# Patient Record
Sex: MALE | Race: WHITE | NOT HISPANIC OR LATINO | Employment: OTHER | URBAN - METROPOLITAN AREA
[De-identification: names, ages, dates, MRNs, and addresses within clinical notes are randomized per-mention and may not be internally consistent; named-entity substitution may affect disease eponyms.]

---

## 2022-03-30 ENCOUNTER — TELEPHONE (OUTPATIENT)
Dept: FAMILY MEDICINE CLINIC | Facility: CLINIC | Age: 67
End: 2022-03-30

## 2022-03-30 PROBLEM — I72.5 BASILAR ARTERY ANEURYSM: Status: ACTIVE | Noted: 2020-01-13

## 2022-03-30 PROBLEM — I10 HTN, GOAL BELOW 130/80: Status: ACTIVE | Noted: 2018-06-29

## 2022-03-30 PROBLEM — N52.8 OTHER MALE ERECTILE DYSFUNCTION: Status: ACTIVE | Noted: 2017-10-19

## 2022-03-30 RX ORDER — PAROXETINE 30 MG/1
30 TABLET, FILM COATED ORAL DAILY
COMMUNITY
Start: 2021-07-21 | End: 2022-03-31 | Stop reason: SDUPTHER

## 2022-03-30 RX ORDER — AMLODIPINE BESYLATE 2.5 MG/1
2.5 TABLET ORAL DAILY
COMMUNITY
Start: 2021-07-21 | End: 2022-03-31 | Stop reason: SDUPTHER

## 2022-03-30 RX ORDER — BISOPROLOL FUMARATE AND HYDROCHLOROTHIAZIDE 10; 6.25 MG/1; MG/1
1 TABLET ORAL DAILY
COMMUNITY
Start: 2021-07-21 | End: 2022-03-31 | Stop reason: SDUPTHER

## 2022-03-30 NOTE — TELEPHONE ENCOUNTER
PATIENT WAS WANTING TO KNOW IF YOU COULD OBTAIN/ REQUEST HIS MED RECORDS FROM [facility]     PLEASE ADVISE   Davi Foster (Self) [phone] (H)

## 2022-03-31 ENCOUNTER — OFFICE VISIT (OUTPATIENT)
Dept: FAMILY MEDICINE CLINIC | Facility: CLINIC | Age: 67
End: 2022-03-31

## 2022-03-31 VITALS
BODY MASS INDEX: 30.45 KG/M2 | SYSTOLIC BLOOD PRESSURE: 135 MMHG | HEIGHT: 67 IN | OXYGEN SATURATION: 93 % | TEMPERATURE: 97.5 F | HEART RATE: 61 BPM | WEIGHT: 194 LBS | DIASTOLIC BLOOD PRESSURE: 86 MMHG

## 2022-03-31 DIAGNOSIS — E66.9 OBESITY, CLASS I, BMI 30-34.9: ICD-10-CM

## 2022-03-31 DIAGNOSIS — N52.8 OTHER MALE ERECTILE DYSFUNCTION: ICD-10-CM

## 2022-03-31 DIAGNOSIS — F41.9 ANXIETY: ICD-10-CM

## 2022-03-31 DIAGNOSIS — I10 ESSENTIAL HYPERTENSION: Primary | ICD-10-CM

## 2022-03-31 PROCEDURE — 99204 OFFICE O/P NEW MOD 45 MIN: CPT | Performed by: NURSE PRACTITIONER

## 2022-03-31 RX ORDER — PAROXETINE 30 MG/1
30 TABLET, FILM COATED ORAL DAILY
Qty: 90 TABLET | Refills: 3 | Status: SHIPPED | OUTPATIENT
Start: 2022-03-31 | End: 2023-03-31

## 2022-03-31 RX ORDER — BISOPROLOL FUMARATE AND HYDROCHLOROTHIAZIDE 10; 6.25 MG/1; MG/1
1 TABLET ORAL DAILY
Qty: 90 TABLET | Refills: 3 | Status: SHIPPED | OUTPATIENT
Start: 2022-03-31 | End: 2023-03-31

## 2022-03-31 RX ORDER — AMLODIPINE BESYLATE 2.5 MG/1
2.5 TABLET ORAL DAILY
Qty: 90 TABLET | Refills: 3 | Status: SHIPPED | OUTPATIENT
Start: 2022-03-31 | End: 2023-03-31

## 2022-03-31 RX ORDER — SILDENAFIL CITRATE 20 MG/1
20 TABLET ORAL 3 TIMES DAILY
COMMUNITY

## 2022-03-31 NOTE — PROGRESS NOTES
Chief Complaint  Anxiety and Hypertension    Subjective          Davi presents to [facility]    67-year-old male presents to the office today for establishment of care and for follow-up on anxiety and hypertension.  Blood pressure is currently controlled on amlodipine 2.5 mg p.o. daily and ZIAC 10-6.25 mg daily.  He has been almost out of his medication and had been taking it every other day for the last couple weeks until he can get into a new PCP.  He has take this medication for a while and has controlled his blood pressure.  His blood pressure runs 120s over 70s at home when he checks it regularly.  Blood pressure in office today was 135/86  .  He denies any headache no blurred vision no dizziness no flushing no chest pain or pressure.    He has been on Paxil 30 mg p.o. tablet daily for his anxiety.  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  He does feel like this controls his anxiety well.  He denies any medication side effects.  He denies any suicidal or homicidal ideations.  He has a support plan in place.  He is not currently seeing anyone for therapy.  He is not ever been on any other medications.  He has increased stress.  He would like at some point to try to wean off this medication but is not ready to do that at this time.    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  Nothing new or nothing changed.    States he had recent lab work with his previous physician will contact the office to have labs sent over not able to view those    He has no other complaints at today    The following portions of the patient's history were reviewed and updated as appropriate: allergies, current medications, past family history, past medical history, past social history, past surgical history, and problem list        yReview of Systems   Constitutional: Negative for chills, fatigue and fever.   Eyes: Negative for visual  "disturbance.   Respiratory: Negative for cough and shortness of breath.    Cardiovascular: Negative for chest pain, palpitations and leg swelling.   Gastrointestinal: Negative for abdominal pain, diarrhea, nausea and vomiting.   Neurological: Positive for weakness. Negative for dizziness and light-headedness.        Objective   Vital Signs:   Vitals:    03/31/22 0807   BP: 135/86   Pulse: 61   Temp: 97.5 °F (36.4 °C)   SpO2: 93%   Weight: 88 kg (194 lb)   Height: 170.2 cm (67\")        Physical Exam  Vitals reviewed.   Constitutional:       Appearance: Normal appearance. He is not ill-appearing.   HENT:      Head: Normocephalic.      Nose: Nose normal.      Mouth/Throat:      Mouth: Mucous membranes are moist.      Pharynx: Oropharynx is clear.   Eyes:      Extraocular Movements: Extraocular movements intact.      Conjunctiva/sclera: Conjunctivae normal.      Pupils: Pupils are equal, round, and reactive to light.   Neck:      Vascular: No carotid bruit.   Cardiovascular:      Rate and Rhythm: Normal rate and regular rhythm.      Pulses: Normal pulses.      Heart sounds: Normal heart sounds. No murmur heard.  Pulmonary:      Effort: Pulmonary effort is normal. No respiratory distress.      Breath sounds: Normal breath sounds. No stridor. No wheezing, rhonchi or rales.   Chest:      Chest wall: No tenderness.   Musculoskeletal:      Cervical back: Neck supple.   Skin:     General: Skin is warm.   Neurological:      Mental Status: He is alert and oriented to person, place, and time.      Gait: Gait abnormal (foot drop right side ).   Psychiatric:         Mood and Affect: Mood normal.         Behavior: Behavior normal.         Thought Content: Thought content normal.         Judgment: Judgment normal.          Result Review :     The following data was reviewed by: MARIA DOLORES Garcia on 03/31/2022:           Assessment and Plan    Diagnoses and all orders for this visit:    1. Essential hypertension " (Primary)  Comments:  Controlled on Rx  Has been out of medication and taking every other day for the past couple of weeks  Monitor blood pressure at home bring log to next visit  Lo  Orders:  -     amLODIPine (NORVASC) 2.5 MG tablet; Take 1 tablet by mouth Daily.  Dispense: 90 tablet; Refill: 3  -     bisoprolol-hydrochlorothiazide (ZIAC) 10-6.25 MG per tablet; Take 1 tablet by mouth Daily.  Dispense: 90 tablet; Refill: 3  -     Comprehensive Metabolic Panel  -     Lipid Panel  -     CBC & Differential  -     TSH  -     T4, Free    2. Anxiety  Comments:  controlled on Paxil 30 mg p.o. daily  Denies any SI repeat.  Support plan in place.  Denies any medication side effects    Orders:  -     PARoxetine (PAXIL) 30 MG tablet; Take 1 tablet by mouth Daily.  Dispense: 90 tablet; Refill: 3  -     Comprehensive Metabolic Panel  -     Lipid Panel  -     CBC & Differential  -     TSH  -     T4, Free    3. Other male erectile dysfunction  Comments:  Uses sildenafil as needed  Rarely uses-no side effects  Does not need refill today    4. Obesity, Class I, BMI 30-34.9  Comments:  Recommend diet and exercise as tolerated goal is 30 minutes each day    Orders:  -     Comprehensive Metabolic Panel  -     Lipid Panel  -     CBC & Differential  -     TSH  -     T4, Free        Follow Up   Return in about 6 months (around 9/30/2022) for Annual physical.  Patient was given instructions and counseling regarding his condition or for health maintenance advice. Please see specific information pulled into the AVS if appropriate.

## 2023-06-13 DIAGNOSIS — I10 ESSENTIAL HYPERTENSION: ICD-10-CM

## 2023-06-13 DIAGNOSIS — F41.9 ANXIETY: ICD-10-CM

## 2023-06-15 RX ORDER — AMLODIPINE BESYLATE 2.5 MG/1
2.5 TABLET ORAL DAILY
Qty: 30 TABLET | Refills: 0 | Status: SHIPPED | OUTPATIENT
Start: 2023-06-15 | End: 2024-06-14

## 2023-06-15 RX ORDER — PAROXETINE 30 MG/1
30 TABLET, FILM COATED ORAL DAILY
Qty: 30 TABLET | Refills: 0 | Status: SHIPPED | OUTPATIENT
Start: 2023-06-15 | End: 2024-06-14

## 2023-06-15 RX ORDER — BISOPROLOL FUMARATE AND HYDROCHLOROTHIAZIDE 10; 6.25 MG/1; MG/1
1 TABLET ORAL DAILY
Qty: 30 TABLET | Refills: 0 | Status: SHIPPED | OUTPATIENT
Start: 2023-06-15 | End: 2024-06-14

## 2023-06-15 NOTE — TELEPHONE ENCOUNTER
Sent a 30 day RX. Pt is needing an appointment.     Okay for the HUB to relay message           Rx Refill Note  Requested Prescriptions     Pending Prescriptions Disp Refills   • bisoprolol-hydrochlorothiazide (ZIAC) 10-6.25 MG per tablet [Pharmacy Med Name: Bisoprolol-hydroCHLOROthiazide Oral Tablet 10-6.25 MG] 90 tablet 0     Sig: TAKE 1 TABLET BY MOUTH DAILY   • amLODIPine (NORVASC) 2.5 MG tablet [Pharmacy Med Name: amLODIPine Besylate Oral Tablet 2.5 MG] 90 tablet 0     Sig: TAKE 1 TABLET BY MOUTH DAILY   • PARoxetine (PAXIL) 30 MG tablet [Pharmacy Med Name: PARoxetine HCl Oral Tablet 30 MG] 90 tablet 0     Sig: TAKE 1 TABLET BY MOUTH DAILY      Last office visit with prescribing clinician: 3/31/2022   Last telemedicine visit with prescribing clinician: Visit date not found   Next office visit with prescribing clinician: Visit date not found                         Would you like a call back once the refill request has been completed: [] Yes [] No    If the office needs to give you a call back, can they leave a voicemail: [] Yes [] No    Maximus Herrera MA  06/15/23, 11:27 EDT

## 2023-06-15 NOTE — TELEPHONE ENCOUNTER
PATIENT IS REQUESTING A CALL BACK TO DISCUSS STATUS OF MEDICATION REFILL. PATIENT IS ALMOST COMPLETELY OUT OF MEDICATION.     HUB ATTEMPTED TO WARM TRANSFER AND WAS UNSUCCESSFUL

## 2023-07-24 ENCOUNTER — OFFICE VISIT (OUTPATIENT)
Dept: FAMILY MEDICINE CLINIC | Facility: CLINIC | Age: 68
End: 2023-07-24
Payer: MEDICARE

## 2023-07-24 VITALS
HEART RATE: 76 BPM | RESPIRATION RATE: 20 BRPM | OXYGEN SATURATION: 96 % | TEMPERATURE: 97 F | SYSTOLIC BLOOD PRESSURE: 133 MMHG | BODY MASS INDEX: 30.45 KG/M2 | HEIGHT: 67 IN | WEIGHT: 194 LBS | DIASTOLIC BLOOD PRESSURE: 79 MMHG

## 2023-07-24 DIAGNOSIS — F41.9 ANXIETY: ICD-10-CM

## 2023-07-24 DIAGNOSIS — I10 ESSENTIAL HYPERTENSION: Primary | ICD-10-CM

## 2023-07-24 DIAGNOSIS — I10 ESSENTIAL HYPERTENSION: ICD-10-CM

## 2023-07-24 DIAGNOSIS — N52.8 OTHER MALE ERECTILE DYSFUNCTION: ICD-10-CM

## 2023-07-24 DIAGNOSIS — E66.9 OBESITY, CLASS I, BMI 30-34.9: ICD-10-CM

## 2023-07-24 PROCEDURE — 3075F SYST BP GE 130 - 139MM HG: CPT | Performed by: NURSE PRACTITIONER

## 2023-07-24 PROCEDURE — 99214 OFFICE O/P EST MOD 30 MIN: CPT | Performed by: NURSE PRACTITIONER

## 2023-07-24 PROCEDURE — 3078F DIAST BP <80 MM HG: CPT | Performed by: NURSE PRACTITIONER

## 2023-07-24 RX ORDER — SILDENAFIL CITRATE 20 MG/1
TABLET ORAL
Qty: 50 TABLET | Refills: 11 | Status: SHIPPED | OUTPATIENT
Start: 2023-07-24

## 2023-07-24 RX ORDER — AMLODIPINE BESYLATE 2.5 MG/1
2.5 TABLET ORAL DAILY
Qty: 90 TABLET | Refills: 3 | Status: SHIPPED | OUTPATIENT
Start: 2023-07-24 | End: 2024-07-23

## 2023-07-24 RX ORDER — NAPROXEN 500 MG/1
500 TABLET ORAL 2 TIMES DAILY WITH MEALS
Qty: 60 TABLET | Refills: 5 | Status: SHIPPED | OUTPATIENT
Start: 2023-07-24 | End: 2024-01-20

## 2023-07-24 RX ORDER — BISOPROLOL FUMARATE AND HYDROCHLOROTHIAZIDE 10; 6.25 MG/1; MG/1
1 TABLET ORAL DAILY
Qty: 90 TABLET | Refills: 3 | Status: SHIPPED | OUTPATIENT
Start: 2023-07-24 | End: 2024-07-23

## 2023-07-24 NOTE — PROGRESS NOTES
Chief Complaint  Hypertension    Subjective          Davi presents to [facility] as a 68-year-old male for follow-up on hypertension, anxiety, refill medication order labs.  Overall doing well    History of hypertension has been controlled on current medication.  He denies any increase or changes in headaches no blurred vision no dizziness no flushing no chest pain or pressure.  Denies any medication side effects.  Does not regularly check his blood pressure at home     He  also presents today for follow up of Depression and Anxiety.  He reports medication is working well, patient desires to continue on Rx, and needs refill. Onset of symptoms was approximately several years ago.  He denies current suicidal and homicidal ideation. Risk factors are family history of anxiety and or depression, marriage difficulties, family situation/stress, prior diagnosis of anxiety, and prior diagnosis of depression.  Previous treatment includes current Rx. He complains of the following medication side effects: none. The patient has had no previous counseling.    PHQ-2 Depression Screening  Little interest or pleasure in doing things? 0-->not at all   Feeling down, depressed, or hopeless? 0-->not at all   PHQ-2 Total Score 0       He is not fasting today and will return for labs    He has no other acute complaints of    The following portions of the patient's history were reviewed and updated as appropriate: allergies, current medications, past family history, past medical history, past social history, past surgical history, and problem list      Review of Systems   Constitutional:  Negative for chills, fatigue and fever.   Eyes:  Negative for visual disturbance.   Respiratory:  Negative for cough, shortness of breath and wheezing.    Cardiovascular:  Negative for chest pain, palpitations and leg swelling.   Gastrointestinal:  Negative for abdominal pain, diarrhea, nausea and vomiting.   Skin:   "Negative for rash.   Neurological:  Negative for dizziness and light-headedness.   Psychiatric/Behavioral:  Negative for self-injury and suicidal ideas. The patient is not nervous/anxious.       Objective   Vital Signs:   Vitals:    07/24/23 1447   BP: 133/79   Pulse: 76   Resp: 20   Temp: 97 °F (36.1 °C)   TempSrc: Skin   SpO2: 96%   Weight: 88 kg (194 lb)   Height: 170.2 cm (67\")        BMI is >= 30 and <35. (Class 1 Obesity). The following options were offered after discussion;: weight loss educational material (shared in after visit summary)        Physical Exam  Vitals reviewed.   Constitutional:       General: He is not in acute distress.  Eyes:      Conjunctiva/sclera: Conjunctivae normal.   Neck:      Thyroid: No thyromegaly.      Vascular: No carotid bruit.   Cardiovascular:      Rate and Rhythm: Normal rate and regular rhythm.      Heart sounds: Normal heart sounds.   Pulmonary:      Effort: Pulmonary effort is normal. No respiratory distress.      Breath sounds: Normal breath sounds. No stridor. No wheezing, rhonchi or rales.   Chest:      Chest wall: No tenderness.   Lymphadenopathy:      Cervical: No cervical adenopathy.   Neurological:      Mental Status: He is alert.   Psychiatric:         Attention and Perception: Attention normal.         Mood and Affect: Mood normal.        Result Review :     The following data was reviewed by: MARIA DOLORES Garcia on 07/24/2023:     No recent labs to review     Assessment and Plan    Diagnoses and all orders for this visit:    1. Essential hypertension (Primary)  Comments:  Continue current management  Monitor BP at home bring log to next visit  Orders:  -     Comprehensive Metabolic Panel  -     CBC & Differential  -     Lipid Panel  -     TSH  -     T4, Free    2. Anxiety  Comments:  Controlled continue current management  Orders:  -     Comprehensive Metabolic Panel  -     CBC & Differential  -     Lipid Panel  -     TSH  -     T4, Free    3. Obesity, Class " I, BMI 30-34.9  Comments:  Continue to work on diet and exercise  Orders:  -     Comprehensive Metabolic Panel  -     CBC & Differential  -     Lipid Panel  -     TSH  -     T4, Free        Follow Up   Return in about 6 months (around 1/24/2024) for Annual physical.  Patient was given instructions and counseling regarding his condition or for health maintenance advice. Please see specific information pulled into the AVS if appropriate.

## 2023-07-26 ENCOUNTER — TELEPHONE (OUTPATIENT)
Dept: FAMILY MEDICINE CLINIC | Facility: CLINIC | Age: 68
End: 2023-07-26

## 2023-07-26 DIAGNOSIS — F41.9 ANXIETY: ICD-10-CM

## 2023-07-26 RX ORDER — PAROXETINE 30 MG/1
30 TABLET, FILM COATED ORAL DAILY
Qty: 90 TABLET | Refills: 1 | Status: SHIPPED | OUTPATIENT
Start: 2023-07-26 | End: 2024-07-25

## 2023-07-26 NOTE — TELEPHONE ENCOUNTER
Rx Refill Note  Requested Prescriptions     Pending Prescriptions Disp Refills    PARoxetine (PAXIL) 30 MG tablet [Pharmacy Med Name: PARoxetine HCl Oral Tablet 30 MG] 90 tablet 1     Sig: TAKE 1 TABLET BY MOUTH DAILY      Last office visit with prescribing clinician: 7/24/2023   Last telemedicine visit with prescribing clinician: Visit date not found   Next office visit with prescribing clinician: 7/26/2023                         Would you like a call back once the refill request has been completed: [] Yes [] No    If the office needs to give you a call back, can they leave a voicemail: [] Yes [] No    Maximus Herrera MA  07/26/23, 16:00 EDT

## 2023-07-26 NOTE — TELEPHONE ENCOUNTER
Caller: Davi Foster    Relationship: Self    Best call back number: [phone]     Requested Prescriptions: PARoxetine (PAXIL) 30 MG tablet     Pharmacy where request should be sent: [id number] - [place], [place] - [address] - [phone]  - [phone] FX     Last office visit with prescribing clinician: 7/24/2023   Last telemedicine visit with prescribing clinician: Visit date not found   Next office visit with prescribing clinician: Visit date not found     Additional details provided by patient: PATIENT STATED HE HAS A 2 DAY SUPPLY REMAINING.    Does the patient have less than a 3 day supply:  [x] Yes  [] No    Would you like a call back once the refill request has been completed: [] Yes [x] No    Katherine Cole Rep   07/26/23 14:54 EDT

## 2024-02-15 ENCOUNTER — OFFICE VISIT (OUTPATIENT)
Dept: FAMILY MEDICINE CLINIC | Facility: CLINIC | Age: 69
End: 2024-02-15
Payer: MEDICARE

## 2024-02-15 VITALS
RESPIRATION RATE: 20 BRPM | DIASTOLIC BLOOD PRESSURE: 58 MMHG | SYSTOLIC BLOOD PRESSURE: 120 MMHG | HEART RATE: 72 BPM | BODY MASS INDEX: 33.43 KG/M2 | HEIGHT: 67 IN | OXYGEN SATURATION: 96 % | WEIGHT: 213 LBS

## 2024-02-15 DIAGNOSIS — N52.9 ERECTILE DYSFUNCTION, UNSPECIFIED ERECTILE DYSFUNCTION TYPE: ICD-10-CM

## 2024-02-15 DIAGNOSIS — N52.8 OTHER MALE ERECTILE DYSFUNCTION: ICD-10-CM

## 2024-02-15 DIAGNOSIS — R53.83 OTHER FATIGUE: ICD-10-CM

## 2024-02-15 DIAGNOSIS — E66.9 OBESITY, CLASS I, BMI 30-34.9: Primary | ICD-10-CM

## 2024-02-15 DIAGNOSIS — E78.2 MIXED HYPERLIPIDEMIA: ICD-10-CM

## 2024-02-15 DIAGNOSIS — F41.9 ANXIETY: ICD-10-CM

## 2024-02-15 DIAGNOSIS — Z12.5 PROSTATE CANCER SCREENING: ICD-10-CM

## 2024-02-15 DIAGNOSIS — R73.09 ELEVATED GLUCOSE: ICD-10-CM

## 2024-02-15 DIAGNOSIS — Z72.0 TOBACCO ABUSE: ICD-10-CM

## 2024-02-15 DIAGNOSIS — I10 ESSENTIAL HYPERTENSION: ICD-10-CM

## 2024-02-15 DIAGNOSIS — E55.9 VITAMIN D DEFICIENCY: ICD-10-CM

## 2024-02-15 PROCEDURE — 3074F SYST BP LT 130 MM HG: CPT | Performed by: NURSE PRACTITIONER

## 2024-02-15 PROCEDURE — 99214 OFFICE O/P EST MOD 30 MIN: CPT | Performed by: NURSE PRACTITIONER

## 2024-02-15 PROCEDURE — 3078F DIAST BP <80 MM HG: CPT | Performed by: NURSE PRACTITIONER

## 2024-02-15 RX ORDER — AMLODIPINE BESYLATE 2.5 MG/1
2.5 TABLET ORAL DAILY
Qty: 90 TABLET | Refills: 3 | Status: SHIPPED | OUTPATIENT
Start: 2024-02-15 | End: 2025-02-14

## 2024-02-15 RX ORDER — BISOPROLOL FUMARATE AND HYDROCHLOROTHIAZIDE 10; 6.25 MG/1; MG/1
1 TABLET ORAL DAILY
Qty: 90 TABLET | Refills: 3 | Status: SHIPPED | OUTPATIENT
Start: 2024-02-15 | End: 2025-02-14

## 2024-02-15 RX ORDER — PAROXETINE 30 MG/1
30 TABLET, FILM COATED ORAL DAILY
Qty: 90 TABLET | Refills: 3 | Status: SHIPPED | OUTPATIENT
Start: 2024-02-15 | End: 2025-02-14

## 2024-02-16 LAB
25(OH)D3+25(OH)D2 SERPL-MCNC: 7.2 NG/ML (ref 30–100)
ALBUMIN SERPL-MCNC: 4.3 G/DL (ref 3.5–5.2)
ALBUMIN/GLOB SERPL: 1.7 G/DL
ALP SERPL-CCNC: 86 U/L (ref 39–117)
ALT SERPL-CCNC: 26 U/L (ref 1–41)
AST SERPL-CCNC: 21 U/L (ref 1–40)
BASOPHILS # BLD AUTO: 0.07 10*3/MM3 (ref 0–0.2)
BASOPHILS NFR BLD AUTO: 0.8 % (ref 0–1.5)
BILIRUB SERPL-MCNC: 0.6 MG/DL (ref 0–1.2)
BUN SERPL-MCNC: 19 MG/DL (ref 8–23)
BUN/CREAT SERPL: 31.1 (ref 7–25)
CALCIUM SERPL-MCNC: 9.7 MG/DL (ref 8.6–10.5)
CHLORIDE SERPL-SCNC: 101 MMOL/L (ref 98–107)
CHOLEST SERPL-MCNC: 200 MG/DL (ref 0–200)
CO2 SERPL-SCNC: 24.5 MMOL/L (ref 22–29)
CREAT SERPL-MCNC: 0.61 MG/DL (ref 0.76–1.27)
EGFRCR SERPLBLD CKD-EPI 2021: 105.3 ML/MIN/1.73
EOSINOPHIL # BLD AUTO: 0.19 10*3/MM3 (ref 0–0.4)
EOSINOPHIL NFR BLD AUTO: 2.1 % (ref 0.3–6.2)
ERYTHROCYTE [DISTWIDTH] IN BLOOD BY AUTOMATED COUNT: 12.6 % (ref 12.3–15.4)
FOLATE SERPL-MCNC: 10.5 NG/ML (ref 4.78–24.2)
GLOBULIN SER CALC-MCNC: 2.6 GM/DL
GLUCOSE SERPL-MCNC: 117 MG/DL (ref 65–99)
HBA1C MFR BLD: 5.6 % (ref 4.8–5.6)
HCT VFR BLD AUTO: 43.7 % (ref 37.5–51)
HDLC SERPL-MCNC: 31 MG/DL (ref 40–60)
HGB BLD-MCNC: 15.4 G/DL (ref 13–17.7)
IMM GRANULOCYTES # BLD AUTO: 0.03 10*3/MM3 (ref 0–0.05)
IMM GRANULOCYTES NFR BLD AUTO: 0.3 % (ref 0–0.5)
LDLC SERPL CALC-MCNC: 60 MG/DL (ref 0–100)
LYMPHOCYTES # BLD AUTO: 2.82 10*3/MM3 (ref 0.7–3.1)
LYMPHOCYTES NFR BLD AUTO: 31.9 % (ref 19.6–45.3)
MCH RBC QN AUTO: 32.4 PG (ref 26.6–33)
MCHC RBC AUTO-ENTMCNC: 35.2 G/DL (ref 31.5–35.7)
MCV RBC AUTO: 92 FL (ref 79–97)
MONOCYTES # BLD AUTO: 0.63 10*3/MM3 (ref 0.1–0.9)
MONOCYTES NFR BLD AUTO: 7.1 % (ref 5–12)
NEUTROPHILS # BLD AUTO: 5.1 10*3/MM3 (ref 1.7–7)
NEUTROPHILS NFR BLD AUTO: 57.8 % (ref 42.7–76)
NRBC BLD AUTO-RTO: 0 /100 WBC (ref 0–0.2)
PLATELET # BLD AUTO: 283 10*3/MM3 (ref 140–450)
POTASSIUM SERPL-SCNC: 4.6 MMOL/L (ref 3.5–5.2)
PROT SERPL-MCNC: 6.9 G/DL (ref 6–8.5)
PSA SERPL-MCNC: 0.72 NG/ML (ref 0–4)
RBC # BLD AUTO: 4.75 10*6/MM3 (ref 4.14–5.8)
SODIUM SERPL-SCNC: 138 MMOL/L (ref 136–145)
T4 FREE SERPL-MCNC: 1.2 NG/DL (ref 0.93–1.7)
TESTOST FREE SERPL-MCNC: 4.6 PG/ML (ref 6.6–18.1)
TESTOST SERPL-MCNC: 146 NG/DL (ref 264–916)
TRIGL SERPL-MCNC: 732 MG/DL (ref 0–150)
TSH SERPL DL<=0.005 MIU/L-ACNC: 4.35 UIU/ML (ref 0.27–4.2)
VIT B12 SERPL-MCNC: <150 PG/ML (ref 211–946)
VLDLC SERPL CALC-MCNC: 109 MG/DL (ref 5–40)
WBC # BLD AUTO: 8.84 10*3/MM3 (ref 3.4–10.8)

## 2024-02-19 DIAGNOSIS — N52.9 ERECTILE DYSFUNCTION, UNSPECIFIED ERECTILE DYSFUNCTION TYPE: ICD-10-CM

## 2024-02-19 DIAGNOSIS — E78.2 MIXED HYPERLIPIDEMIA: ICD-10-CM

## 2024-02-19 DIAGNOSIS — R79.89 LOW TESTOSTERONE: ICD-10-CM

## 2024-02-19 DIAGNOSIS — E53.9 VITAMIN B DEFICIENCY: ICD-10-CM

## 2024-02-19 DIAGNOSIS — E55.9 VITAMIN D DEFICIENCY: Primary | ICD-10-CM

## 2024-02-19 RX ORDER — ERGOCALCIFEROL 1.25 MG/1
50000 CAPSULE ORAL
Qty: 12 CAPSULE | Refills: 3 | Status: SHIPPED | OUTPATIENT
Start: 2024-02-19

## 2024-02-19 RX ORDER — ATORVASTATIN CALCIUM 20 MG/1
20 TABLET, FILM COATED ORAL DAILY
Qty: 90 TABLET | Refills: 3 | Status: SHIPPED | OUTPATIENT
Start: 2024-02-19

## 2024-02-19 RX ORDER — CYANOCOBALAMIN 1000 UG/ML
1000 INJECTION, SOLUTION INTRAMUSCULAR; SUBCUTANEOUS
Status: SHIPPED | OUTPATIENT
Start: 2024-02-19

## 2024-03-13 ENCOUNTER — OFFICE VISIT (OUTPATIENT)
Dept: FAMILY MEDICINE CLINIC | Facility: CLINIC | Age: 69
End: 2024-03-13
Payer: MEDICARE

## 2024-03-13 VITALS
HEIGHT: 67 IN | TEMPERATURE: 98.2 F | WEIGHT: 208.9 LBS | OXYGEN SATURATION: 96 % | DIASTOLIC BLOOD PRESSURE: 82 MMHG | HEART RATE: 75 BPM | BODY MASS INDEX: 32.79 KG/M2 | SYSTOLIC BLOOD PRESSURE: 140 MMHG

## 2024-03-13 DIAGNOSIS — R79.89 LOW TESTOSTERONE: ICD-10-CM

## 2024-03-13 DIAGNOSIS — R53.83 OTHER FATIGUE: ICD-10-CM

## 2024-03-13 DIAGNOSIS — E53.9 VITAMIN B DEFICIENCY: Primary | ICD-10-CM

## 2024-03-13 DIAGNOSIS — I10 ESSENTIAL HYPERTENSION: ICD-10-CM

## 2024-03-13 DIAGNOSIS — E55.9 VITAMIN D DEFICIENCY: ICD-10-CM

## 2024-03-13 DIAGNOSIS — E66.9 OBESITY, CLASS I, BMI 30-34.9: ICD-10-CM

## 2024-03-13 DIAGNOSIS — F41.9 ANXIETY: ICD-10-CM

## 2024-03-13 DIAGNOSIS — E78.2 MIXED HYPERLIPIDEMIA: ICD-10-CM

## 2024-03-13 DIAGNOSIS — N52.9 ERECTILE DYSFUNCTION, UNSPECIFIED ERECTILE DYSFUNCTION TYPE: ICD-10-CM

## 2024-03-13 RX ORDER — CYANOCOBALAMIN 1000 UG/ML
1000 INJECTION, SOLUTION INTRAMUSCULAR; SUBCUTANEOUS
Status: SHIPPED | OUTPATIENT
Start: 2024-03-13

## 2024-03-13 RX ADMIN — CYANOCOBALAMIN 1000 MCG: 1000 INJECTION, SOLUTION INTRAMUSCULAR; SUBCUTANEOUS at 10:11

## 2024-03-13 NOTE — PROGRESS NOTES
Chief Complaint  Follow-up (On obesity)    Subjective        HPI   Davi presents to [facility] as a 69-year-old male to follow-up, and discuss labs ordered on last visit/treatment options.  Overall doing okay.      He has been working hard since last visit on his diet and exercise.  He is already lost 5 pounds in the last month.  He is watching his carb and sugar intake closely and trying to get some exercise    He is still feeling slightly fatigued.  He is starting to take vitamin D supplement weekly  He is agreeable to trying vitamin B injection today in office     Anxiety and depression seem to be improving.  He is taking his medication as directed.  He denies any SI or HI.  He is not currently counseling.  He declines any need for change in medication     Hyperlipidemia-discussed elevated triglycerides.  He would like to try some diet and exercise changes prior to initiating atorvastatin.  He has not started taking that medication.    He has no other acute complaints at today      The following portions of the patient's history were reviewed and updated as appropriate: allergies, current medications, past family history, past medical history, past social history, past surgical history, and problem list          Review of Systems   Constitutional:  Positive for fatigue. Negative for chills and fever.   Eyes:  Negative for visual disturbance.   Respiratory:  Negative for cough, shortness of breath, wheezing and stridor.    Cardiovascular:  Negative for chest pain, palpitations and leg swelling.   Gastrointestinal:  Negative for abdominal pain, diarrhea, nausea and vomiting.   Neurological:  Negative for dizziness.   Psychiatric/Behavioral:  Negative for self-injury, sleep disturbance and suicidal ideas. The patient is not nervous/anxious.         Objective   Vital Signs:   Vitals:    03/13/24 0907   BP: 140/82   Pulse: 75   Temp: 98.2 °F (36.8 °C)   SpO2: 96%   Weight: 94.8 kg (208  "lb 14.4 oz)   Height: 170.2 cm (67.01\")            7/24/2023     2:50 PM   PHQ-2/PHQ-9 Depression Screening   Little Interest or Pleasure in Doing Things 0-->not at all   Feeling Down, Depressed or Hopeless 0-->not at all   PHQ-9: Brief Depression Severity Measure Score 0                 Physical Exam  Vitals reviewed.   Constitutional:       General: He is not in acute distress.  Eyes:      Conjunctiva/sclera: Conjunctivae normal.   Neck:      Thyroid: No thyromegaly.      Vascular: No carotid bruit.   Cardiovascular:      Rate and Rhythm: Normal rate and regular rhythm.      Heart sounds: Normal heart sounds. No murmur heard.  Pulmonary:      Effort: Pulmonary effort is normal. No respiratory distress.      Breath sounds: Normal breath sounds. No stridor. No wheezing, rhonchi or rales.   Chest:      Chest wall: No tenderness.   Lymphadenopathy:      Cervical: No cervical adenopathy.   Neurological:      Mental Status: He is alert and oriented to person, place, and time.   Psychiatric:         Attention and Perception: Attention normal.         Mood and Affect: Mood normal.          Result Review :     The following data was reviewed by: MARIA DOLORES Garcia on 03/13/2024:    Comprehensive metabolic panel (02/15/2024 10:34)  Lipid panel (02/15/2024 10:34)  CBC and Differential (02/15/2024 10:34)  TSH (02/15/2024 10:34)  PSA Screen (02/15/2024 10:34)  T4, Free (02/15/2024 10:34)  Vitamin B12 (02/15/2024 10:34)  Folate (02/15/2024 10:34)  Vitamin D,25-Hydroxy (02/15/2024 10:34)  Testosterone, Free, Total (02/15/2024 10:34)  Hemoglobin A1c (02/15/2024 10:34)    Declines referral to urology for testosterone treatment at this time  He is agreeable to trying vitamin B injection received in office today  Taking vitamin D supplement weekly  Working on diet and exercise changes prior to initiating atorvastatin  Recheck labs in 3 months    Your kidney function and liver function look normal  Your blood sugar is just " slightly elevated but your hemoglobin A1c/3-month blood sugar average is 5.6  So no concerns for diabetes at this time     Your thyroid -TSH is just slightly above normal  But free T4 is normal  So we will plan to repeat this in 6 months     Your triglycerides are very high  Need to work hard on your diet and exercise  I would recommend starting some cholesterol medication     Plan to repeat your labs in 6 6 months  Yasmeen  Your vitamin B and vitamin D is low  We can set you up for some vitamin B injections monthly to can help with fatigue if you are agreeable  I will also send a vitamin D supplement for you to take once per week to the pharmacy        Your testosterone is low  I can refer you to urology to discuss         Assessment and Plan    Assessment & Plan  Vitamin B deficiency  Vitamin B injection in office today  Low testosterone  Declined referral to urology at this time  Erectile dysfunction, unspecified erectile dysfunction type  Declined referral to urology at this time low testosterone  Mixed hyperlipidemia  Continue to work on lower cholesterol diet and exercise   Continue lifestyle modifications for high cholesterol.  Decrease/eliminate soda, caffeine, alcohol and overall caloric intake. Reduce carbohydrates and sweets in diet.  Continue to improve dietary habits with lean proteins, fresh vegetables, fruits, and nuts. Improve aerobic exercise: walking/biking/swimming daily as tolerated, recommend 30 minutes/day at least.     Vitamin D deficiency  Weekly vitamin D supplement initiated  Anxiety  Controlled continue current management  Essential hypertension  Controlled continue current management  Monitor BP at home bring log to next visit    Obesity, Class I, BMI 30-34.9  Patient's (Body mass index is 32.71 kg/m².) indicates that they are obese (BMI >30) with health conditions that include dyslipidemias . Weight is unchanged. BMI  is above average; BMI management plan is completed. We discussed  portion control and increasing exercise.   Other fatigue  Vitamin  D and B supplements initiated    Orders Placed This Encounter   Procedures    Comprehensive metabolic panel    Lipid panel    TSH    Vitamin D,25-Hydroxy    Vitamin B12    Folate    CBC and Differential     New Medications Ordered This Visit   Medications    cyanocobalamin injection 1,000 mcg          Follow Up   Return in about 3 months (around 6/13/2024) for Next scheduled follow up/labs.  Patient was given instructions and counseling regarding his condition or for health maintenance advice. Please see specific information pulled into the AVS if appropriate.

## 2025-04-03 DIAGNOSIS — I10 ESSENTIAL HYPERTENSION: ICD-10-CM

## 2025-04-03 DIAGNOSIS — F41.9 ANXIETY: ICD-10-CM

## 2025-04-03 RX ORDER — BISOPROLOL FUMARATE AND HYDROCHLOROTHIAZIDE 10; 6.25 MG/1; MG/1
1 TABLET ORAL DAILY
Qty: 90 TABLET | Refills: 0 | Status: SHIPPED | OUTPATIENT
Start: 2025-04-03 | End: 2026-04-03

## 2025-04-03 RX ORDER — PAROXETINE 30 MG/1
30 TABLET, FILM COATED ORAL DAILY
Qty: 90 TABLET | Refills: 0 | Status: SHIPPED | OUTPATIENT
Start: 2025-04-03 | End: 2026-04-03

## 2025-04-03 NOTE — TELEPHONE ENCOUNTER
Caller: Davi Foster    Relationship: Self    Best call back number: [phone]     Requested Prescriptions:   Requested Prescriptions     Pending Prescriptions Disp Refills    bisoprolol-hydrochlorothiazide (ZIAC) 10-6.25 MG per tablet 90 tablet 3     Sig: Take 1 tablet by mouth Daily.    PARoxetine (PAXIL) 30 MG tablet 90 tablet 3     Sig: Take 1 tablet by mouth Daily.    AMLODIPINE    Pharmacy where request should be sent: [id number] - [address] - [phone]  - [phone] FX     Last office visit with prescribing clinician: 3/13/2024   Last telemedicine visit with prescribing clinician: Visit date not found   Next office visit with prescribing clinician: 4/11/2025     Additional details provided by patient: PATIENT NEEDS THESE SENT TO NEW PHARMACY.     Does the patient have less than a 3 day supply:  [x] Yes  [] No    Would you like a call back once the refill request has been completed: [] Yes [x] No    If the office needs to give you a call back, can they leave a voicemail: [] Yes [x] No    Katherine Vasquez Rep   04/03/25 09:05 EDT

## 2025-04-03 NOTE — TELEPHONE ENCOUNTER
Rx Refill Note  Requested Prescriptions     Pending Prescriptions Disp Refills    bisoprolol-hydrochlorothiazide (ZIAC) 10-6.25 MG per tablet 90 tablet 3     Sig: Take 1 tablet by mouth Daily.    PARoxetine (PAXIL) 30 MG tablet 90 tablet 3     Sig: Take 1 tablet by mouth Daily.      Last office visit with prescribing clinician: 3/13/2024   Last telemedicine visit with prescribing clinician: Visit date not found   Next office visit with prescribing clinician: 4/11/2025                         Would you like a call back once the refill request has been completed: [] Yes [] No    If the office needs to give you a call back, can they leave a voicemail: [] Yes [] No    Isac Contreras MA  04/03/25, 09:09 EDT

## 2025-04-05 RX ORDER — AMLODIPINE BESYLATE 2.5 MG/1
2.5 TABLET ORAL DAILY
Qty: 30 TABLET | Refills: 0 | Status: SHIPPED | OUTPATIENT
Start: 2025-04-05

## 2025-04-07 ENCOUNTER — DOCUMENTATION (OUTPATIENT)
Dept: FAMILY MEDICINE CLINIC | Facility: CLINIC | Age: 70
End: 2025-04-07
Payer: MEDICARE

## 2025-04-07 NOTE — PROGRESS NOTES
Patient called on Saturday, 4-5-25 to have me refill a 30-day supply of amlodipine for hypertension and I did send this to his pharmacy and he did make an appointment to see Yasmeen

## 2025-04-11 ENCOUNTER — OFFICE VISIT (OUTPATIENT)
Dept: FAMILY MEDICINE CLINIC | Facility: CLINIC | Age: 70
End: 2025-04-11
Payer: MEDICARE

## 2025-04-11 VITALS
HEIGHT: 67 IN | BODY MASS INDEX: 33.43 KG/M2 | DIASTOLIC BLOOD PRESSURE: 80 MMHG | HEART RATE: 75 BPM | OXYGEN SATURATION: 96 % | WEIGHT: 213 LBS | TEMPERATURE: 96.5 F | SYSTOLIC BLOOD PRESSURE: 140 MMHG

## 2025-04-11 DIAGNOSIS — E53.8 VITAMIN B 12 DEFICIENCY: ICD-10-CM

## 2025-04-11 DIAGNOSIS — F41.9 ANXIETY: ICD-10-CM

## 2025-04-11 DIAGNOSIS — Z00.00 MEDICARE ANNUAL WELLNESS VISIT, SUBSEQUENT: Primary | ICD-10-CM

## 2025-04-11 DIAGNOSIS — M15.0 PRIMARY OSTEOARTHRITIS INVOLVING MULTIPLE JOINTS: ICD-10-CM

## 2025-04-11 DIAGNOSIS — I10 ESSENTIAL HYPERTENSION: ICD-10-CM

## 2025-04-11 DIAGNOSIS — E78.2 MIXED HYPERLIPIDEMIA: ICD-10-CM

## 2025-04-11 RX ORDER — ATORVASTATIN CALCIUM 20 MG/1
20 TABLET, FILM COATED ORAL DAILY
Qty: 90 TABLET | Refills: 3 | Status: SHIPPED | OUTPATIENT
Start: 2025-04-11

## 2025-04-11 RX ORDER — PAROXETINE 30 MG/1
30 TABLET, FILM COATED ORAL DAILY
Qty: 90 TABLET | Refills: 2 | Status: SHIPPED | OUTPATIENT
Start: 2025-04-11 | End: 2026-04-11

## 2025-04-11 RX ORDER — CYANOCOBALAMIN 1000 UG/ML
1000 INJECTION, SOLUTION INTRAMUSCULAR; SUBCUTANEOUS
Status: SHIPPED | OUTPATIENT
Start: 2025-04-11

## 2025-04-11 RX ORDER — AMLODIPINE BESYLATE 2.5 MG/1
2.5 TABLET ORAL DAILY
Qty: 90 TABLET | Refills: 2 | Status: SHIPPED | OUTPATIENT
Start: 2025-04-11

## 2025-04-11 RX ORDER — MELOXICAM 15 MG/1
15 TABLET ORAL DAILY
Qty: 90 TABLET | Refills: 1 | Status: SHIPPED | OUTPATIENT
Start: 2025-04-11

## 2025-04-11 RX ORDER — BISOPROLOL FUMARATE AND HYDROCHLOROTHIAZIDE 10; 6.25 MG/1; MG/1
1 TABLET ORAL DAILY
Qty: 90 TABLET | Refills: 2 | Status: SHIPPED | OUTPATIENT
Start: 2025-04-11 | End: 2026-04-11

## 2025-04-11 RX ADMIN — CYANOCOBALAMIN 1000 MCG: 1000 INJECTION, SOLUTION INTRAMUSCULAR; SUBCUTANEOUS at 16:00

## 2025-04-11 NOTE — PROGRESS NOTES
The ABCs of the Annual Wellness Visit  Subsequent Medicare Wellness Visit    Subjective    Davi Foster is a 70 y.o. male who presents for a Subsequent Medicare Wellness Visit.    The following portions of the patient's history were reviewed and   updated as appropriate: allergies, current medications, past family history, past medical history, past social history, past surgical history, and problem list.    Compared to one year ago, the patient feels his physical   health is the same.    Compared to one year ago, the patient feels his mental   health is the same.    Recent Hospitalizations:  He was not been admitted within the past 365 days at  hospital.       Current Medical Providers:  Patient Care Team:  Yasmeen Alas APRN as PCP - General (Nurse Practitioner)    Outpatient Medications Prior to Visit   Medication Sig Dispense Refill    vitamin D (ERGOCALCIFEROL) 1.25 MG (52190 UT) capsule capsule Take 1 capsule by mouth Every 7 (Seven) Days. 12 capsule 3    amLODIPine (NORVASC) 2.5 MG tablet Take 1 tablet by mouth Daily. For BP 30 tablet 0    atorvastatin (LIPITOR) 20 MG tablet Take 1 tablet by mouth Daily. 90 tablet 3    bisoprolol-hydrochlorothiazide (ZIAC) 10-6.25 MG per tablet Take 1 tablet by mouth Daily. 90 tablet 0    PARoxetine (PAXIL) 30 MG tablet Take 1 tablet by mouth Daily. 90 tablet 0    sildenafil (REVATIO) 20 MG tablet Take 3-5 tablets  po 60 minutes prior to sexual activity with a light meal as needed (Patient not taking: Reported on 4/11/2025) 50 tablet 11     Facility-Administered Medications Prior to Visit   Medication Dose Route Frequency Provider Last Rate Last Admin    cyanocobalamin injection 1,000 mcg  1,000 mcg Intramuscular Q28 Days Yasmeen Alas APRN   1,000 mcg at 03/13/24 1011       No opioid medication identified on active medication list. I have reviewed chart for other potential  high risk medication/s and harmful drug interactions in the elderly.        Aspirin is not on  "active medication list.  Aspirin use is not indicated based on review of current medical condition/s. Risk of harm outweighs potential benefits.  .    Patient Active Problem List   Diagnosis    Anxiety    HTN, goal below 130/80    Basilar artery aneurysm    Other male erectile dysfunction     Advance Care Planning   Advance Care Planning     Advance Directive is not on file.  ACP discussion was held with the patient during this visit. Patient does not have an advance directive, information provided.     Objective    Vitals:    25 1515   BP: 140/80   Pulse: 75   Temp: 96.5 °F (35.8 °C)   SpO2: 96%   Weight: 96.6 kg (213 lb)   Height: 170.2 cm (67.01\")   PainSc: 4    PainLoc: Leg     Estimated body mass index is 33.35 kg/m² as calculated from the following:    Height as of this encounter: 170.2 cm (67.01\").    Weight as of this encounter: 96.6 kg (213 lb).    BMI is >= 30 and <35. (Class 1 Obesity). The following options were offered after discussion;: weight loss educational material (shared in after visit summary)      Does the patient have evidence of cognitive impairment? No          HEALTH RISK ASSESSMENT    Smoking Status:  Social History     Tobacco Use   Smoking Status Every Day    Current packs/day: 1.50    Average packs/day: 1.5 packs/day for 50.3 years (75.4 ttl pk-yrs)    Types: Cigarettes    Start date: 1975   Smokeless Tobacco Never     Alcohol Consumption:  Social History     Substance and Sexual Activity   Alcohol Use Never     Fall Risk Screen:    STEADI Fall Risk Assessment was completed, and patient is at LOW risk for falls.Assessment completed on:2025    Depression Screenin/11/2025     3:16 PM   PHQ-2/PHQ-9 Depression Screening   Little interest or pleasure in doing things Not at all   Feeling down, depressed, or hopeless Not at all   How difficult have these problems made it for you to do your work, take care of things at home, or get along with other people? Not difficult " at all       Health Habits and Functional and Cognitive Screenin/11/2025     3:16 PM   Functional & Cognitive Status   Do you have difficulty preparing food and eating? No   Do you have difficulty bathing yourself, getting dressed or grooming yourself? No   Do you have difficulty using the toilet? No   Do you have difficulty moving around from place to place? No   Do you have trouble with steps or getting out of a bed or a chair? No   Current Diet Unhealthy Diet   Dental Exam Not up to date   Eye Exam Not up to date   Exercise (times per week) 0 times per week   Current Exercises Include No Regular Exercise   Do you need help using the phone?  No   Are you deaf or do you have serious difficulty hearing?  No   Do you need help to go to places out of walking distance? No   Do you need help shopping? No   Do you need help preparing meals?  No   Do you need help with housework?  No   Do you need help with laundry? No   Do you need help taking your medications? No   Do you need help managing money? No   Do you ever drive or ride in a car without wearing a seat belt? No   Have you felt unusual stress, anger or loneliness in the last month? No   Who do you live with? Alone   If you need help, do you have trouble finding someone available to you? No   Have you been bothered in the last four weeks by sexual problems? No   Do you have difficulty concentrating, remembering or making decisions? No       Age-appropriate Screening Schedule:  Refer to the list below for future screening recommendations based on patient's age, sex and/or medical conditions. Orders for these recommended tests are listed in the plan section. The patient has been provided with a written plan.    Health Maintenance   Topic Date Due    Pneumococcal Vaccine 50+ (1 of 2 - PCV) Never done    TDAP/TD VACCINES (1 - Tdap) Never done    COLORECTAL CANCER SCREENING  Never done    ZOSTER VACCINE (1 of 2) Never done    LUNG CANCER SCREENING  Never done     AAA SCREEN ONCE  Never done    ANNUAL WELLNESS VISIT  03/30/2022    COVID-19 Vaccine (1 - 2024-25 season) Never done    LIPID PANEL  02/15/2025    INFLUENZA VACCINE  07/01/2025    HEPATITIS C SCREENING  Completed                  CMS Preventative Services Quick Reference  Risk Factors Identified During Encounter  Immunizations Discussed/Encouraged: Tdap, Influenza, and Prevnar 20 (Pneumococcal 20-valent conjugate)  Dental Screening Recommended  Vision Screening Recommended  The above risks/problems have been discussed with the patient.  Pertinent information has been shared with the patient in the After Visit Summary.  An After Visit Summary and PPPS were made available to the patient.    Follow Up:   Next Medicare Wellness visit to be scheduled in 1 year.       Additional E&M Note during same encounter follows:  Patient has multiple medical problems which are significant and separately identifiable that require additional work above and beyond the Medicare Wellness Visit.      Chief Complaint  Medicare Wellness-subsequent and Leg Pain    Subjective        HPI  Davi Foster is also being seen today for follow up on chronic conditions, vitamin B injection and to discuss on going arthritis/ joint pain.    Hypertension-has been well-controlled on current medication.  Denies any increase or changes in headaches no blurred vision no dizziness no flushing no chest pain or pressure       He has been working hard since last visit on his diet and exercise.  He is already lost 5 pounds in the last month.  He is watching his carb and sugar intake closely and trying to get some exercise  He is having some increased pain in his hips and knees bilaterally.  States that he has osteoarthritis.  Has not tried any anti-inflammatories takes Tylenol or Motrin as needed  Pain is intermittent 2-6 out of 10  He declines physical therapy  Is requesting pain medication     He is still feeling slightly fatigued.  He is starting to take  "vitamin D supplement weekly  He is agreeable to trying vitamin B injection today in office      Anxiety and depression seem to be improving.  He is taking his medication as directed.  He denies any SI or HI.  He is not currently counseling.  He declines any need for change in medication      Hyperlipidemia-discussed elevated triglycerides.  He would like to try some diet and exercise changes prior to initiating atorvastatin.  He has not started taking that medication.    Declines labs today  Will return fasting      No other acute complaints    The following portions of the patient's history were reviewed and updated as appropriate: allergies, current medications, past family history, past medical history, past social history, past surgical history, and problem list      Review of Systems   Constitutional:  Positive for fatigue. Negative for chills and fever.   Eyes:  Negative for visual disturbance.   Respiratory:  Negative for cough, shortness of breath, wheezing and stridor.    Cardiovascular:  Negative for chest pain, palpitations and leg swelling.   Gastrointestinal:  Negative for abdominal pain, constipation, diarrhea, nausea and vomiting.   Endocrine: Negative for polydipsia, polyphagia and polyuria.   Musculoskeletal:  Positive for arthralgias.   Neurological:  Negative for dizziness.   Psychiatric/Behavioral:  Negative for self-injury, sleep disturbance and suicidal ideas. The patient is not nervous/anxious.        Objective   Vital Signs:  /80   Pulse 75   Temp 96.5 °F (35.8 °C)   Ht 170.2 cm (67.01\")   Wt 96.6 kg (213 lb)   SpO2 96%   BMI 33.35 kg/m²     Physical Exam  Vitals reviewed.   Constitutional:       General: He is not in acute distress.  Eyes:      Conjunctiva/sclera: Conjunctivae normal.   Neck:      Thyroid: No thyromegaly.      Vascular: No carotid bruit.   Cardiovascular:      Rate and Rhythm: Normal rate and regular rhythm.      Heart sounds: Normal heart sounds. No murmur " heard.  Pulmonary:      Effort: Pulmonary effort is normal. No respiratory distress.      Breath sounds: Normal breath sounds. No stridor. No wheezing, rhonchi or rales.   Chest:      Chest wall: No tenderness.   Lymphadenopathy:      Cervical: No cervical adenopathy.   Skin:     General: Skin is warm.      Capillary Refill: Capillary refill takes less than 2 seconds.   Neurological:      Mental Status: He is alert and oriented to person, place, and time.   Psychiatric:         Attention and Perception: Attention normal.         Mood and Affect: Mood normal.          The following data was reviewed by: MARIA DOLORES Garcia on 04/11/2025:    Comprehensive metabolic panel (02/15/2024 10:34)  Lipid panel (02/15/2024 10:34)  CBC and Differential (02/15/2024 10:34)  TSH (02/15/2024 10:34)  PSA Screen (02/15/2024 10:34)  T4, Free (02/15/2024 10:34)  Vitamin B12 (02/15/2024 10:34)  Folate (02/15/2024 10:34)  Vitamin D,25-Hydroxy (02/15/2024 10:34)  Testosterone, Free, Total (02/15/2024 10:34)  Hemoglobin A1c (02/15/2024 10:34)    Your kidney function and liver function look normal   Your blood sugar is just slightly elevated but your hemoglobin A1c/3-month blood sugar average is 5.6   So no concerns for diabetes at this time     Your thyroid -TSH is just slightly above normal   But free T4 is normal   So we will plan to repeat this in 6 months     Your triglycerides are very high   Need to work hard on your diet and exercise   I would recommend starting some cholesterol medication     Plan to repeat your labs in 6 6 months   Yasmeen   Your vitamin B and vitamin D is low   We can set you up for some vitamin B injections monthly to can help with fatigue if you are agreeable   I will also send a vitamin D supplement for you to take once per week to the pharmacy       Your testosterone is low   I can refer you to urology to discuss treatment options if you are agreeable                      Assessment and Plan   Assessment &  Plan  Mixed hyperlipidemia   Lipid abnormalities are stable    Plan:  Continue same medication/s without change.      Discussed medication dosage, use, side effects, and goals of treatment in detail.    Counseled patient on lifestyle modifications to help control hyperlipidemia.     Patient Treatment Goals:   LDL goal is under 100    Followup in 6 months.    Orders:    atorvastatin (LIPITOR) 20 MG tablet; Take 1 tablet by mouth Daily.    Essential hypertension  Hypertension is stable and controlled  Continue current treatment regimen.  Blood pressure will be reassessed in 6 months.    Orders:    amLODIPine (NORVASC) 2.5 MG tablet; Take 1 tablet by mouth Daily. For BP    bisoprolol-hydrochlorothiazide (ZIAC) 10-6.25 MG per tablet; Take 1 tablet by mouth Daily.    Anxiety  Stable continue current management  Orders:    PARoxetine (PAXIL) 30 MG tablet; Take 1 tablet by mouth Daily.    Vitamin B 12 deficiency  Vitamin B injection in office today  Can repeat every 28 days  Recheck on next labs  Orders:    cyanocobalamin injection 1,000 mcg    Medicare annual wellness visit, subsequent  Healthy well-balanced diet  Exercise 30 minutes most days of the week  Make sure you get results on any labs or tests we ordered today  We discussed medications and how to take them as prescribed  Sleep 6-8 hours each night if possible  If you have not signed up for [employer], please activate your code ASAP from your After Visit Summary today     LDL goal <100  LDL goal if heart disease <70  HDL goal >60  Triglyceride goal <150  BP goal =<130/80  Fasting glucose <100         Primary osteoarthritis involving multiple joints  [facility]-  needs lab on next office visit  Declined PT  Orders:    meloxicam (Mobic) 15 MG tablet; Take 1 tablet by mouth Daily.              Follow Up   No follow-ups on file.  Patient was given instructions and counseling regarding his condition or for health maintenance advice. Please see specific information pulled  into the AVS if appropriate.

## 2025-04-11 NOTE — ASSESSMENT & PLAN NOTE
Stable continue current management  Orders:    PARoxetine (PAXIL) 30 MG tablet; Take 1 tablet by mouth Daily.